# Patient Record
Sex: MALE | Race: OTHER | HISPANIC OR LATINO | Employment: FULL TIME | URBAN - METROPOLITAN AREA
[De-identification: names, ages, dates, MRNs, and addresses within clinical notes are randomized per-mention and may not be internally consistent; named-entity substitution may affect disease eponyms.]

---

## 2021-04-08 ENCOUNTER — HOSPITAL ENCOUNTER (EMERGENCY)
Facility: MEDICAL CENTER | Age: 36
End: 2021-04-09
Attending: EMERGENCY MEDICINE
Payer: COMMERCIAL

## 2021-04-08 DIAGNOSIS — E86.0 DEHYDRATION: ICD-10-CM

## 2021-04-08 DIAGNOSIS — R55 SYNCOPE, UNSPECIFIED SYNCOPE TYPE: ICD-10-CM

## 2021-04-08 DIAGNOSIS — S01.81XA FACIAL LACERATION, INITIAL ENCOUNTER: ICD-10-CM

## 2021-04-08 DIAGNOSIS — T14.8XXA ABRASION: ICD-10-CM

## 2021-04-08 LAB
BASOPHILS # BLD AUTO: 0.6 % (ref 0–1.8)
BASOPHILS # BLD: 0.05 K/UL (ref 0–0.12)
EKG IMPRESSION: NORMAL
EOSINOPHIL # BLD AUTO: 0.02 K/UL (ref 0–0.51)
EOSINOPHIL NFR BLD: 0.2 % (ref 0–6.9)
ERYTHROCYTE [DISTWIDTH] IN BLOOD BY AUTOMATED COUNT: 45.8 FL (ref 35.9–50)
HCT VFR BLD AUTO: 44.6 % (ref 42–52)
HGB BLD-MCNC: 15.3 G/DL (ref 14–18)
IMM GRANULOCYTES # BLD AUTO: 0.02 K/UL (ref 0–0.11)
IMM GRANULOCYTES NFR BLD AUTO: 0.2 % (ref 0–0.9)
LYMPHOCYTES # BLD AUTO: 2.66 K/UL (ref 1–4.8)
LYMPHOCYTES NFR BLD: 32.8 % (ref 22–41)
MCH RBC QN AUTO: 31.4 PG (ref 27–33)
MCHC RBC AUTO-ENTMCNC: 34.3 G/DL (ref 33.7–35.3)
MCV RBC AUTO: 91.6 FL (ref 81.4–97.8)
MONOCYTES # BLD AUTO: 0.36 K/UL (ref 0–0.85)
MONOCYTES NFR BLD AUTO: 4.4 % (ref 0–13.4)
NEUTROPHILS # BLD AUTO: 4.99 K/UL (ref 1.82–7.42)
NEUTROPHILS NFR BLD: 61.8 % (ref 44–72)
NRBC # BLD AUTO: 0 K/UL
NRBC BLD-RTO: 0 /100 WBC
PLATELET # BLD AUTO: 168 K/UL (ref 164–446)
PMV BLD AUTO: 12.1 FL (ref 9–12.9)
RBC # BLD AUTO: 4.87 M/UL (ref 4.7–6.1)
WBC # BLD AUTO: 8.1 K/UL (ref 4.8–10.8)

## 2021-04-08 PROCEDURE — 99284 EMERGENCY DEPT VISIT MOD MDM: CPT

## 2021-04-08 PROCEDURE — 303747 HCHG EXTRA SUTURE

## 2021-04-08 PROCEDURE — 700105 HCHG RX REV CODE 258: Performed by: EMERGENCY MEDICINE

## 2021-04-08 PROCEDURE — 85025 COMPLETE CBC W/AUTO DIFF WBC: CPT

## 2021-04-08 PROCEDURE — 304999 HCHG REPAIR-SIMPLE/INTERMED LEVEL 1

## 2021-04-08 PROCEDURE — 80053 COMPREHEN METABOLIC PANEL: CPT

## 2021-04-08 PROCEDURE — 93005 ELECTROCARDIOGRAM TRACING: CPT | Performed by: EMERGENCY MEDICINE

## 2021-04-08 RX ORDER — SODIUM CHLORIDE 9 MG/ML
1000 INJECTION, SOLUTION INTRAVENOUS ONCE
Status: COMPLETED | OUTPATIENT
Start: 2021-04-08 | End: 2021-04-09

## 2021-04-08 RX ADMIN — SODIUM CHLORIDE 1000 ML: 9 INJECTION, SOLUTION INTRAVENOUS at 23:26

## 2021-04-08 NOTE — LETTER
"  FORM C-4:  EMPLOYEE’S CLAIM FOR COMPENSATION/ REPORT OF INITIAL TREATMENT  EMPLOYEE’S CLAIM - PROVIDE ALL INFORMATION REQUESTED   First Name Casper Last Name Terrence Cr Birthdate 1985  Sex male Claim Number   Home Address [address]             Zip [address]                                   Age  36 y.o. 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 (if applicable) He was on Brake    How did this injury or occupational disease occur? Be specific and answer in detail. Use additional sheet if necessary)  Outside of the bilding on brade   If you believe that you have an occupational disease, when did you first have knowledge of the disability and it relationship to your employment? n/a Witnesses to the Accident  Yes Bandar   Nature of Injury or Occupational Disease  Workers' Compensation Part(s) of Body Injured or Affected  Soft Tissue, Mouth, N/A    I CERTIFY THAT THE ABOVE IS TRUE AND CORRECT TO THE BEST OF MY KNOWLEDGE AND THAT I HAVE PROVIDED THIS INFORMATION IN ORDER TO OBTAIN THE BENEFITS OF [place]).  I HEREBY AUTHORIZE ANY PHYSICIAN, " CHIROPRACTOR, SURGEON, PRACTITIONER, OR OTHER PERSON, ANY HOSPITAL, INCLUDING [facility] OR [facility], ANY MEDICAL SERVICE ORGANIZATION, ANY INSURANCE COMPANY, OR OTHER INSTITUTION OR ORGANIZATION TO RELEASE TO EACH OTHER, ANY MEDICAL OR OTHER INFORMATION, INCLUDING BENEFITS PAID OR PAYABLE, PERTINENT TO THIS INJURY OR DISEASE, EXCEPT INFORMATION RELATIVE TO DIAGNOSIS, TREATMENT AND/OR COUNSELING FOR AIDS, PSYCHOLOGICAL CONDITIONS, ALCOHOL OR CONTROLLED SUBSTANCES, FOR WHICH I MUST GIVE SPECIFIC AUTHORIZATION.  A PHOTOSTAT OF THIS AUTHORIZATION SHALL BE AS VALID AS THE ORIGINAL.  Date  4/8/2021               Place    [facility]                    Employee’s Signature   THIS REPORT MUST BE COMPLETED AND MAILED WITHIN 3 WORKING DAYS OF TREATMENT   Place [facility], EMERGENCY DEPT                       Name of Facility [facility]   Date  4/8/2021 Diagnosis  (R55) Syncope, unspecified syncope type  (E86.0) Dehydration  (S01.81XA) Facial laceration, initial encounter  (T14.8XXA) Abrasion Is there evidence the injured employee was under the influence of alcohol and/or another controlled substance at the time of accident?   Hour  11:15 PM Description of Injury or Disease  Syncope, unspecified syncope type  Dehydration  Facial laceration, initial encounter  Abrasion No   Treatment  Exam, fluids, stitches  Have you advised the patient to remain off work five days or more?         No   X-Ray Findings    If Yes   From Date    To Date      From information given by the employee, together with medical evidence, can you directly connect this injury or occupational disease as job incurred? No  Comments:Unclear cause of syncope. Possibly work related due to dehydration on the job If No, is employee capable of: Full Duty  Yes Modified Duty      Is additional medical care by a physician indicated? Yes If Modified Duty, Specify any Limitations /  "Restrictions       Do you know of any previous injury or disease contributing to this condition or occupational disease? No    Date 4/8/2021 Print Doctor’s Name Erwin Ramos I certify the employer’s copy of this form was mailed on:   Address [address] [place] [address]  AFSHAN SAVAGE [id number] [phone] INSURER’S USE ONLY   Provider’s Tax ID Number [id number] Telephone Dept: [phone]    Doctor’s Signature ERWIN Strickland M.D. Degree  M.D.       Form C-4 (rev.10/07)                                                                         BRIEF DESCRIPTION OF RIGHTS AND BENEFITS  (Pursuant to [place] 616[place])    Notice of Injury or Occupational Disease (Incident Report Form C-1): If an injury or occupational disease (OD) arises out of and in the course of employment, you must provide written notice to your employer as soon as practicable, but no later than 7 days after the accident or OD. Your employer shall maintain a sufficient supply of the required forms.    Claim for Compensation (Form C-4): If medical treatment is sought, the form C-4 is available at the place of initial treatment. A completed \"Claim for Compensation\" (Form C-4) must be filed within 90 days after an accident or OD. The treating physician or chiropractor must, within 3 working days after treatment, complete and mail to the employer, the employer's insurer and third-party , the Claim for Compensation.    Medical Treatment: If you require medical treatment for your on-the-job injury or OD, you may be required to select a physician or chiropractor from a list provided by your workers’ compensation insurer, if it has contracted with an Organization for Managed Care (MCO) or Preferred Provider Organization (PPO) or providers of health care. If your employer has not entered into a contract with an MCO or PPO, you may select a physician or chiropractor from the Panel of Physicians and Chiropractors. Any medical costs related to your industrial " injury or OD will be paid by your insurer.    Temporary Total Disability (TTD): If your doctor has certified that you are unable to work for a period of at least 5 consecutive days, or 5 cumulative days in a 20-day period, or places restrictions on you that your employer does not accommodate, you may be entitled to TTD compensation.    Temporary Partial Disability (TPD): If the wage you receive upon reemployment is less than the compensation for TTD to which you are entitled, the insurer may be required to pay you TPD compensation to make up the difference. TPD can only be paid for a maximum of 24 months.    Permanent Partial Disability (PPD): When your medical condition is stable and there is an indication of a PPD as a result of your injury or OD, within 30 days, your insurer must arrange for an evaluation by a rating physician or chiropractor to determine the degree of your PPD. The amount of your PPD award depends on the date of injury, the results of the PPD evaluation, your age and wage.    Permanent Total Disability (PTD): If you are medically certified by a treating physician or chiropractor as permanently and totally disabled and have been granted a PTD status by your insurer, you are entitled to receive monthly benefits not to exceed 66 2/3% of your average monthly wage. The amount of your PTD payments is subject to reduction if you previously received a lump-sum PPD award.    Vocational Rehabilitation Services: You may be eligible for vocational rehabilitation services if you are unable to return to the job due to a permanent physical impairment or permanent restrictions as a result of your injury or occupational disease.    Transportation and Per Martina Reimbursement: You may be eligible for travel expenses and per martina associated with medical treatment.    Reopening: You may be able to reopen your claim if your condition worsens after claim closure.     Appeal Process: If you disagree with a written  determination issued by the insurer or the insurer does not respond to your request, you may appeal to the Department of Administration, , by following the instructions contained in your determination letter. You must appeal the determination within 70 days from the date of the determination letter at [address], [address], [place], [place] [address], or [address], [address], [place], [place] [address]. If you disagree with the  decision, you may appeal to the Department of Administration, . You must file your appeal within 30 days from the date of the  decision letter at [address], [address], [place], [place] [address], or [address], [address], [place], [place] [address]. If you disagree with a decision of an , you may file a petition for judicial review with the District Court. You must do so within 30 days of the Appeal Officer’s decision. You may be represented by an  at your own expense or you may contact the [facility] for possible representation.    [place]  for Injured Workers (NAIW): If you disagree with a  decision, you may request that NAIW represent you without charge at an  Hearing. For information regarding denial of benefits, you may contact the [facility] at: [address], [address], [place] [address], [phone], or [address], [address], [place] [address], [phone]    To File a Complaint with the Division: If you wish to file a complaint with the  of the Division of Industrial Relations (DIR),  please contact the Workers’ Compensation Section, [address], [address], [place], [place] [address], telephone ([phone]) [phone], or [address], [address], [place], [place] [address], telephone [phone]) [phone].    For assistance with Workers’ Compensation Issues: You may contact the [facility]  Office for Consumer Health Assistance, [address] [place] [address], [facility], [place] [address], Toll Free 1-493.183.6722, Web site: http://[facility].[place].gov/Programs/KULDEEP E-mail: kuldeep@[facility].[place].gov  D-2 (rev. 10/20)              __________________________________________________________________                                    _______4/8/2021__________            Employee Name / Signature                                                                                                                            Date

## 2021-04-09 VITALS
DIASTOLIC BLOOD PRESSURE: 80 MMHG | HEART RATE: 105 BPM | SYSTOLIC BLOOD PRESSURE: 128 MMHG | RESPIRATION RATE: 16 BRPM | TEMPERATURE: 98.7 F | HEIGHT: 67 IN | WEIGHT: 150 LBS | BODY MASS INDEX: 23.54 KG/M2 | OXYGEN SATURATION: 97 %

## 2021-04-09 LAB
ALBUMIN SERPL BCP-MCNC: 4.5 G/DL (ref 3.2–4.9)
ALBUMIN/GLOB SERPL: 1.6 G/DL
ALP SERPL-CCNC: 69 U/L (ref 30–99)
ALT SERPL-CCNC: 21 U/L (ref 2–50)
ANION GAP SERPL CALC-SCNC: 13 MMOL/L (ref 7–16)
AST SERPL-CCNC: 35 U/L (ref 12–45)
BILIRUB SERPL-MCNC: 0.6 MG/DL (ref 0.1–1.5)
BUN SERPL-MCNC: 20 MG/DL (ref 8–22)
CALCIUM SERPL-MCNC: 9.5 MG/DL (ref 8.5–10.5)
CHLORIDE SERPL-SCNC: 101 MMOL/L (ref 96–112)
CO2 SERPL-SCNC: 22 MMOL/L (ref 20–33)
CREAT SERPL-MCNC: 0.8 MG/DL (ref 0.5–1.4)
GLOBULIN SER CALC-MCNC: 2.9 G/DL (ref 1.9–3.5)
GLUCOSE SERPL-MCNC: 181 MG/DL (ref 65–99)
POTASSIUM SERPL-SCNC: 4.5 MMOL/L (ref 3.6–5.5)
PROT SERPL-MCNC: 7.4 G/DL (ref 6–8.2)
SODIUM SERPL-SCNC: 136 MMOL/L (ref 135–145)

## 2021-04-09 NOTE — ED NOTES
DC instructions reviewed with pt via . Pt verbalized understanding. Pt ambulated to [facility] with steady gait.

## 2021-04-09 NOTE — DISCHARGE INSTRUCTIONS
You were seen in the ED for syncope (loss of consciousness). Your medical evaluation, physical exam and EKG were reassuring. At this time, the cause of your syncope does not appear to be dangerous. Please drink plenty of fluids and eat regular meals. Please take care when getting up from sitting or standing.     You were treated today for a laceration. Your physical exam is reassuring. Your evaluation did not show any signs of foreign body in the wound. Your laceration was repaired with  absorbable stitches.     Your stitches are absorbable and will begin to break down in 1-2 weeks. Once they break down, they can be gently pulled to free them from the skin.    Keep the wound dry for the first 48 hours. After 48 hours, you may wash the area with soap and water, however do not soak the wound. Do not swim until your stitches have been removed.     Use petroleum jelly or antibiotic ointment to keep the wound moist and supple. The scar will continue to remodel for 6 months to a year. Exposure to sun will make the scar more noticeable, so use sun protection.       Please return to the ED or seek medical attention if you develop:  - Chest pain  - Shortness of breath  - Loss of consciousness  -Severe progressive headache  -Fevers  -Spreading redness or pus draining from your wounds  -Confusion  -Vomiting  - Other concerning findings  Please follow up with your regular doctor.

## 2021-04-09 NOTE — ED PROVIDER NOTES
"ED Provider Note    Scribed for Erwin Ramos M.D. by Fuentes Salas. 4/8/2021,  9:53 PM.    Means of Arrival: EMS  History obtained from: patient,   History limited by: none    CHIEF COMPLAINT  Chief Complaint   Patient presents with    Loss of Consciousness     LOC while walking with friends       [facility]  Casper Cr is a 36 y.o. male with a history of diabetes who presents to the Emergency Department for loss of consciousness onset tonight. The patient states he had been standing for a while when he began to feel dizzy and suddenly loss consciousness, injuring his mouth on the way down. Downtime is unknown. He reports associated headache and facial injury at bedside. He states he has not been eating or drinking much today. He denies any shortness of breath, chest pain, nausea, vomiting. He has a history of diabetes and takes metformin for it. He denies any previous history of loss of consciousness. He believes he is up to date on his tetanus vaccine.    REVIEW OF SYSTEMS  HENT: Mouth injury.  CARDIOVASCULAR:  No chest discomfort.  RESPIRATORY:  No pleuritic chest pain or shortness of breath.  GASTROINTESTINAL:  No nausea or vomiting.  NEUROLOGIC:  Loss of consciousness, dizziness, headache.  See HPI for further details.   All other systems are negative.    PAST MEDICAL HISTORY  Diabetes.    FAMILY HISTORY  None noted.    SOCIAL HISTORY   reports that he has been smoking. He has never used smokeless tobacco. He reports current alcohol use. He reports that he does not use drugs.    SURGICAL HISTORY  None noted.    CURRENT MEDICATIONS  Home Medications    **Home medications have not yet been reviewed for this encounter**         ALLERGIES  None noted.    PHYSICAL EXAM  VITAL SIGNS: /92   Pulse 88   Temp 37 °C (98.6 °F) (Temporal)   Resp 16   Ht 1.69 m (5' 6.54\")   Wt 68 kg (150 lb)   SpO2 98%   BMI 23.82 kg/m²    con: Comfortable  HENT: Normocephalic. No vo sign, no " recognize. Abrasion just inferior to right nose. 1.5 cm laceration on chin. 1 cm laceration inside lower lip.  Eyes: Normal conjuctiva, P ER RLA, EOM I  Resp: Clear to auscultation, no wheezes, rales or crackles  CV: Regular Rate and Rhythm, Normal first and second heart sounds, no murmurs, rubs or gallups. Equal radial pulses. No pedal edema.   Abd: Soft, Nontender, Nondistended, no rebound or guarding.  : No costovertebral angle tenderness  MSK: No deformities. No calf tenderness. Abrasions bilateral upper extremities  Skin: No rash, Warm and dry, No petechiae  Neuro: Speech fluent, GCS 15, moves all extremities, no focal neurologic deficits  Psych: Normal mood/mentation      DIAGNOSTIC STUDIES / PROCEDURES     EKG  Results for orders placed or performed during the hospital encounter of 21   EKG (NOW)   Result Value Ref Range    Report       [facility] Emergency Dept.    Test Date:  2021  Pt Name:    AUDRA ASNCHES           Department: ER  MRN:        [id number]                      Room:       BL 21 H  Gender:     Male                         Technician: [id number]  :        1985                   Requested By:NOLAN RAMOS  Order #:    [id number]                    Reading MD: Nolan Ramos    Measurements  Intervals                                Axis  Rate:       102                          P:          58  NH:         164                          QRS:        82  QRSD:       90                           T:          48  QT:         344  QTc:        449    Interpretive Statements  SINUS TACHYCARDIA  BASELINE WANDER IN LEAD(S) V5  No previous ECG available for comparison  Electronically Signed On 2021 23:14:50 PDT by Nolan Ramos          LABS  Labs Reviewed   COMP METABOLIC PANEL - Abnormal; Notable for the following components:       Result Value    Glucose 181 (*)     All other components within normal limits   CBC WITH DIFFERENTIAL   ESTIMATED GFR     All labs reviewed by  me.        Laceration Repair Procedure Note    Indication: Laceration    Procedure: The patient was placed in the appropriate position and anesthesia around the laceration was obtained by infiltration using 1% Lidocaine with epinephrine. The area was then irrigated with normal saline. The laceration was closed with 5-0 fast absorbing gut sutures. There were no additional lacerations requiring repair.     Total repaired wound length: 1.5 cm.     Other Items: Suture count: 3    The patient tolerated the procedure well.    Complications: None      COURSE & MEDICAL DECISION MAKING  Pertinent Labs & Imaging studies reviewed. (See chart for details)    9:53 PM Patient seen and examined at bedside. Ordered for labs and to evaluate. Patient will be treated with NS infusion 1 L for his symptoms.     HYDRATION: Based on the patient's presentation of Dehydration the patient was given IV fluids. IV Hydration was used because oral hydration was not adequate alone. Upon recheck following hydration, the patient was improved.    11:05 PM Laceration repair performed at this time. See procedure note for details. Patient appears improved after IV fluids.    12:23 AM - Patient seen at bedside. Discussed lab results with the patient and updated them on the plan of care, including the plan for discharge. I answered all questions regarding their care and discussed strict return precautions for new or changing symptoms. Patient verbalizes understanding and agreement to this plan of care.     Medical Decision Making:  patient presents with symptoms concerning for syncope, which is likely hypovolemia in the setting of dehydration. There are no high risk syncope features, including family history of sudden death or drowning, murmur, recurrent syncope, EKG suggestive of ARVD, long QT, short QT, HOCM, pre-excitation, heart block, ischemia or Brugada syndrome. Pt was able to ambulate without orthostatic symptoms. They will be referred to their  doctor for follow up.    Patient did have a laceration to the chin reports is tetanus is up-to-date. The patient demonstrates no neurologic deficits to suggest clinically significant traumatic brain injury, spinal injury. No evidence of mid face instability or other trauma. Patient demonstrates no stigmata of DVT/PE. Low suspicion for ACS. Patient was counseled on return precautions, anticipatory guidance, and was provided with the opportunist questions prior to discharge    I wore a mask and eye protection throughout the encounter.    The patient will return for new or worsening symptoms and is stable at the time of discharge.    The patient is referred to a primary physician for blood pressure management, diabetic screening, and for all other preventative health concerns.    DISPOSITION:  Patient will be discharged home in stable condition.    FOLLOW UP:  [facility], Emergency Dept  [address] [place] [id number] [phone]    If symptoms worsen    Your regular doctor    Schedule an appointment as soon as possible for a visit       OUTPATIENT MEDICATIONS:  There are no discharge medications for this patient.        FINAL IMPRESSION  1. Syncope, unspecified syncope type    2. Dehydration    3. Facial laceration, initial encounter    4. Abrasion            IFuentes (Pj), am scribing for, and in the presence of, Erwin Ramos M.D..    Electronically signed by: Fuentes Salas (Pj), 4/8/2021    IErwin M.D. personally performed the services described in this documentation, as scribed by Fuentes Salas in my presence, and it is both accurate and complete.    The note accurately reflects work and decisions made by me.  Erwin Ramos M.D.  4/9/2021  4:22 AM      This dictation was created using voice recognition software. The accuracy of the dictation is limited to the abilities of the software. I expect there may be some errors of grammar and possibly content.  The nursing notes were reviewed and certain aspects of this information were incorporated into this note. C